# Patient Record
Sex: FEMALE | URBAN - METROPOLITAN AREA
[De-identification: names, ages, dates, MRNs, and addresses within clinical notes are randomized per-mention and may not be internally consistent; named-entity substitution may affect disease eponyms.]

---

## 2022-08-23 ENCOUNTER — APPOINTMENT (RX ONLY)
Dept: URBAN - METROPOLITAN AREA CLINIC 5 | Facility: CLINIC | Age: 62
Setting detail: DERMATOLOGY
End: 2022-08-23

## 2022-08-23 DIAGNOSIS — Z41.9 ENCOUNTER FOR PROCEDURE FOR PURPOSES OTHER THAN REMEDYING HEALTH STATE, UNSPECIFIED: ICD-10-CM

## 2022-08-23 PROCEDURE — ? FACIAL

## 2022-08-23 ASSESSMENT — LOCATION DETAILED DESCRIPTION DERM
LOCATION DETAILED: LEFT CENTRAL MALAR CHEEK
LOCATION DETAILED: RIGHT INFERIOR CENTRAL MALAR CHEEK
LOCATION DETAILED: LEFT INFERIOR MEDIAL FOREHEAD
LOCATION DETAILED: LEFT MEDIAL BUCCAL CHEEK

## 2022-08-23 ASSESSMENT — LOCATION SIMPLE DESCRIPTION DERM
LOCATION SIMPLE: LEFT FOREHEAD
LOCATION SIMPLE: RIGHT CHEEK
LOCATION SIMPLE: LEFT CHEEK

## 2022-08-23 ASSESSMENT — LOCATION ZONE DERM: LOCATION ZONE: FACE

## 2022-08-23 NOTE — PROCEDURE: FACIAL
Comments (Non-Sticky): Pt was more concerned with her under eye bags , explained to her that we don’t really have much to treat that area did give her a recommendation for a specialist, also suggested getting bbl for her reds has a lot of broken capillaries and redness in her cheeks says that’s to painful with stay with facials .
Extraction Method: sterile needle
Mask Type (Optional): calming
Treatment Type (Optional): Deep Cleansing Facial
Exfoliation Type Override: dermapeel , vit c enzyme
Detail Level: Zone
Exfoliation Type: enzyme peel
Comments (Sticky): Gentle cleanse, dermapeel, vit c enzyme, extractions,lactic peel , phyto mask , vit c and triple lipid  spf